# Patient Record
Sex: MALE | Race: WHITE | NOT HISPANIC OR LATINO | Employment: OTHER | URBAN - METROPOLITAN AREA
[De-identification: names, ages, dates, MRNs, and addresses within clinical notes are randomized per-mention and may not be internally consistent; named-entity substitution may affect disease eponyms.]

---

## 2017-01-19 RX ORDER — CITALOPRAM 20 MG/1
TABLET ORAL
Qty: 30 TABLET | Refills: 1 | Status: SHIPPED | OUTPATIENT
Start: 2017-01-19 | End: 2017-03-10 | Stop reason: SDUPTHER

## 2017-01-19 RX ORDER — TRAZODONE HYDROCHLORIDE 100 MG/1
TABLET ORAL
Qty: 30 TABLET | Refills: 1 | Status: SHIPPED | OUTPATIENT
Start: 2017-01-19 | End: 2017-03-10 | Stop reason: SDUPTHER

## 2017-01-31 ENCOUNTER — HOSPITAL ENCOUNTER (OUTPATIENT)
Dept: SLEEP MEDICINE | Facility: HOSPITAL | Age: 62
Discharge: HOME | End: 2017-01-31
Attending: INTERNAL MEDICINE | Admitting: INTERNAL MEDICINE

## 2017-02-22 ENCOUNTER — TELEPHONE (OUTPATIENT)
Dept: FAMILY MEDICINE CLINIC | Facility: CLINIC | Age: 62
End: 2017-02-22

## 2017-02-22 DIAGNOSIS — M50.90 CERVICAL NECK PAIN WITH EVIDENCE OF DISC DISEASE: ICD-10-CM

## 2017-02-22 RX ORDER — PENICILLIN V POTASSIUM 500 MG/1
500 TABLET ORAL 4 TIMES DAILY
Qty: 28 TABLET | Refills: 0 | Status: SHIPPED | OUTPATIENT
Start: 2017-02-22 | End: 2017-02-22 | Stop reason: SDUPTHER

## 2017-02-22 RX ORDER — PENICILLIN V POTASSIUM 500 MG/1
500 TABLET ORAL 4 TIMES DAILY
Qty: 28 TABLET | Refills: 0 | Status: SHIPPED | OUTPATIENT
Start: 2017-02-22 | End: 2017-07-07

## 2017-02-22 RX ORDER — OXYCODONE AND ACETAMINOPHEN 10; 325 MG/1; MG/1
1 TABLET ORAL 4 TIMES DAILY PRN
Qty: 120 TABLET | Refills: 0 | Status: SHIPPED | OUTPATIENT
Start: 2017-02-22 | End: 2017-03-10 | Stop reason: SDUPTHER

## 2017-02-22 NOTE — TELEPHONE ENCOUNTER
Patient would like prescription for an antibiotic due to infected tooth and also a prescription for his pain medication

## 2017-03-10 ENCOUNTER — OFFICE VISIT (OUTPATIENT)
Dept: FAMILY MEDICINE CLINIC | Facility: CLINIC | Age: 62
End: 2017-03-10

## 2017-03-10 VITALS
WEIGHT: 159.3 LBS | HEIGHT: 69 IN | TEMPERATURE: 98.2 F | BODY MASS INDEX: 23.6 KG/M2 | DIASTOLIC BLOOD PRESSURE: 80 MMHG | HEART RATE: 83 BPM | OXYGEN SATURATION: 93 % | SYSTOLIC BLOOD PRESSURE: 118 MMHG

## 2017-03-10 DIAGNOSIS — M50.90 CERVICAL NECK PAIN WITH EVIDENCE OF DISC DISEASE: ICD-10-CM

## 2017-03-10 DIAGNOSIS — F33.41 RECURRENT MAJOR DEPRESSIVE DISORDER, IN PARTIAL REMISSION (HCC): ICD-10-CM

## 2017-03-10 DIAGNOSIS — M51.36 DEGENERATION OF INTERVERTEBRAL DISC OF LUMBAR REGION: Primary | ICD-10-CM

## 2017-03-10 PROCEDURE — 99213 OFFICE O/P EST LOW 20 MIN: CPT | Performed by: FAMILY MEDICINE

## 2017-03-10 RX ORDER — CITALOPRAM 20 MG/1
20 TABLET ORAL EVERY MORNING
Qty: 90 TABLET | Refills: 3 | Status: SHIPPED | OUTPATIENT
Start: 2017-03-10 | End: 2018-06-04 | Stop reason: SDUPTHER

## 2017-03-10 RX ORDER — EPINEPHRINE 0.3 MG/.3ML
1 INJECTION INTRAMUSCULAR
COMMUNITY
Start: 2017-01-31

## 2017-03-10 RX ORDER — OXYCODONE AND ACETAMINOPHEN 10; 325 MG/1; MG/1
1 TABLET ORAL 4 TIMES DAILY PRN
Qty: 120 TABLET | Refills: 0 | Status: SHIPPED | OUTPATIENT
Start: 2017-03-10 | End: 2017-03-10 | Stop reason: SDUPTHER

## 2017-03-10 RX ORDER — THEOPHYLLINE 300 MG/1
TABLET, EXTENDED RELEASE ORAL
Refills: 11 | COMMUNITY
Start: 2017-02-28 | End: 2018-06-04

## 2017-03-10 RX ORDER — OXYCODONE AND ACETAMINOPHEN 10; 325 MG/1; MG/1
1 TABLET ORAL 4 TIMES DAILY PRN
Qty: 120 TABLET | Refills: 0 | Status: SHIPPED | OUTPATIENT
Start: 2017-03-10 | End: 2017-05-16 | Stop reason: SDUPTHER

## 2017-03-10 RX ORDER — LIDOCAINE AND PRILOCAINE 25; 25 MG/G; MG/G
1 CREAM TOPICAL
COMMUNITY
Start: 2016-12-13

## 2017-03-10 RX ORDER — TRAZODONE HYDROCHLORIDE 100 MG/1
100 TABLET ORAL NIGHTLY
Qty: 90 TABLET | Refills: 3 | Status: SHIPPED | OUTPATIENT
Start: 2017-03-10 | End: 2018-06-04 | Stop reason: SDUPTHER

## 2017-03-10 NOTE — PROGRESS NOTES
Chief Complaint   Patient presents with   • COPD   • Asthma   • Emphysema   • Cough   • Shortness of Breath       Low Back Pain: Patient complains of chronic low back pain. This is evaluated as a non injury. The patient first noted symptoms 10years ago. It was not related to no known injury. The pain is rated moderate, unremitting, and is located at the across the lower back or and neck. The pain is described as aching and occurs all day. The symptoms does not been progressive. Symptoms are exacerbated by coughing, extension, flexion, lifting, straining and twisting. Factors which relieve the pain include change in body position and narcotic pain medications. Other associated symptoms include no other symptoms. Previous history of symptoms: the problem is long-standing.   Treatment efforts have included rest, ice, OTC NSAIDS, prescription NSAIDS, acetaminophen, oral steroids, muscle relaxers, PT, chiropractic, home exercises, massage and traction, without relief.    Seeing new Pulm over in [place]    Still on immunoglobulin therapy,  No longer IV, it is now sub Q, via a pump at home      Objective   General:  Alert , no distress  HEENT:  WNL  Heart: RR , no murmur  Vascular: good pulses in legs/feet  Lungs: dec BS  Back: mild decrease in ROM.    Neuro: Gait is normal, reflexes normal.  Skin: no rash.    Assessment/Plan   Nahun was seen today for copd, asthma, emphysema, cough and shortness of breath.    Diagnoses and all orders for this visit:    Degeneration of intervertebral disc of lumbar region    Recurrent major depressive disorder, in partial remission  -     citalopram (CeleXA) 20 MG tablet; Take 1 tablet by mouth Every Morning.  -     traZODone (DESYREL) 100 MG tablet; Take 1 tablet by mouth Every Night.    Cervical neck pain with evidence of disc disease  -     Discontinue: oxyCODONE-acetaminophen (PERCOCET)  MG per tablet; Take 1 tablet by mouth 4 (Four) Times a Day As Needed for severe pain  (7-10).  -     oxyCODONE-acetaminophen (PERCOCET)  MG per tablet; Take 1 tablet by mouth 4 (Four) Times a Day As Needed for severe pain (7-10).              Medications Discontinued During This Encounter   Medication Reason   • citalopram (CeleXA) 20 MG tablet Reorder   • traZODone (DESYREL) 100 MG tablet Reorder   • oxyCODONE-acetaminophen (PERCOCET)  MG per tablet Reorder   • oxyCODONE-acetaminophen (PERCOCET)  MG per tablet Reorder        There are no Patient Instructions on file for this visit.      Patient has been prescribed controlled medications.  Treatment discussed  NURYS updated and reviewed.  Risk and Benefits discussed.  Per [place]HB1.    We reviewed home treatments for back pain  No heavy lifting  Nightly back stretches  OTC medications: Tylenol, Advil, Aleve, IcyHot Patches  Hot soaks in tub.    Dr. Wilbert Dixon MD  [place], [place]  [facility].

## 2017-04-03 ENCOUNTER — HOSPITAL ENCOUNTER (OUTPATIENT)
Dept: GENERAL RADIOLOGY | Facility: HOSPITAL | Age: 62
Discharge: HOME | End: 2017-04-03
Attending: INTERNAL MEDICINE | Admitting: INTERNAL MEDICINE

## 2017-04-25 ENCOUNTER — HOSPITAL ENCOUNTER (OUTPATIENT)
Dept: SLEEP MEDICINE | Facility: HOSPITAL | Age: 62
Discharge: HOME | End: 2017-04-25
Attending: INTERNAL MEDICINE | Admitting: INTERNAL MEDICINE

## 2017-05-16 ENCOUNTER — TELEPHONE (OUTPATIENT)
Dept: FAMILY MEDICINE CLINIC | Facility: CLINIC | Age: 62
End: 2017-05-16

## 2017-05-16 DIAGNOSIS — Z51.81 MEDICATION MONITORING ENCOUNTER: Primary | ICD-10-CM

## 2017-05-16 DIAGNOSIS — M50.90 CERVICAL NECK PAIN WITH EVIDENCE OF DISC DISEASE: ICD-10-CM

## 2017-05-16 RX ORDER — OXYCODONE AND ACETAMINOPHEN 10; 325 MG/1; MG/1
1 TABLET ORAL 4 TIMES DAILY PRN
Qty: 120 TABLET | Refills: 0 | Status: SHIPPED | OUTPATIENT
Start: 2017-05-16 | End: 2017-05-16 | Stop reason: SDUPTHER

## 2017-05-16 RX ORDER — OXYCODONE AND ACETAMINOPHEN 10; 325 MG/1; MG/1
1 TABLET ORAL 4 TIMES DAILY PRN
Qty: 120 TABLET | Refills: 0 | Status: SHIPPED | OUTPATIENT
Start: 2017-05-16 | End: 2017-06-14 | Stop reason: SDUPTHER

## 2017-05-18 ENCOUNTER — TELEPHONE (OUTPATIENT)
Dept: FAMILY MEDICINE CLINIC | Facility: CLINIC | Age: 62
End: 2017-05-18

## 2017-05-19 ENCOUNTER — HOSPITAL ENCOUNTER (OUTPATIENT)
Dept: RESPIRATORY THERAPY | Facility: HOSPITAL | Age: 62
Discharge: HOME | End: 2017-05-19
Attending: INTERNAL MEDICINE | Admitting: INTERNAL MEDICINE

## 2017-05-27 LAB
AMPHETAMINES UR QL SCN: NEGATIVE NG/ML
BARBITURATES UR QL SCN: NEGATIVE NG/ML
BENZODIAZ UR QL SCN: NEGATIVE NG/ML
BZE UR QL SCN: NEGATIVE NG/ML
CANNABINOIDS UR QL CFM: POSITIVE
CREAT UR-MCNC: 72.3 MG/DL (ref 20–300)
FENTANYL+NORFENTANYL UR QL SCN: NEGATIVE PG/ML
Lab: ABNORMAL
MEPERIDINE UR QL: NEGATIVE NG/ML
METHADONE UR QL SCN: NEGATIVE NG/ML
OPIATES UR QL SCN: NEGATIVE NG/ML
OXYCODONE+OXYMORPHONE UR QL SCN: POSITIVE
PCP UR QL: NEGATIVE NG/ML
PH UR: 6.5 [PH] (ref 4.5–8.9)
PROPOXYPH UR QL SCN: NEGATIVE NG/ML
SP GR UR: 1.01
TRAMADOL UR QL SCN: NEGATIVE NG/ML

## 2017-06-14 DIAGNOSIS — M50.90 CERVICAL NECK PAIN WITH EVIDENCE OF DISC DISEASE: ICD-10-CM

## 2017-06-14 RX ORDER — OXYCODONE AND ACETAMINOPHEN 10; 325 MG/1; MG/1
1 TABLET ORAL 4 TIMES DAILY PRN
Qty: 120 TABLET | Refills: 0 | Status: SHIPPED | OUTPATIENT
Start: 2017-06-14 | End: 2017-06-14 | Stop reason: SDUPTHER

## 2017-06-14 RX ORDER — OXYCODONE AND ACETAMINOPHEN 10; 325 MG/1; MG/1
1 TABLET ORAL 4 TIMES DAILY PRN
Qty: 120 TABLET | Refills: 0 | Status: CANCELLED | OUTPATIENT
Start: 2017-06-14

## 2017-06-14 RX ORDER — OXYCODONE AND ACETAMINOPHEN 10; 325 MG/1; MG/1
1 TABLET ORAL 4 TIMES DAILY PRN
Qty: 120 TABLET | Refills: 0 | Status: SHIPPED | OUTPATIENT
Start: 2017-06-14 | End: 2017-07-07 | Stop reason: SDUPTHER

## 2017-07-07 ENCOUNTER — OFFICE VISIT (OUTPATIENT)
Dept: FAMILY MEDICINE CLINIC | Facility: CLINIC | Age: 62
End: 2017-07-07

## 2017-07-07 VITALS
BODY MASS INDEX: 23.89 KG/M2 | WEIGHT: 161.3 LBS | HEART RATE: 85 BPM | TEMPERATURE: 98.2 F | DIASTOLIC BLOOD PRESSURE: 80 MMHG | OXYGEN SATURATION: 98 % | SYSTOLIC BLOOD PRESSURE: 110 MMHG | HEIGHT: 69 IN

## 2017-07-07 DIAGNOSIS — M50.90 CERVICAL NECK PAIN WITH EVIDENCE OF DISC DISEASE: ICD-10-CM

## 2017-07-07 DIAGNOSIS — J43.1 PANLOBULAR EMPHYSEMA (HCC): ICD-10-CM

## 2017-07-07 DIAGNOSIS — M50.90 DISC DISORDER OF CERVICAL REGION: Primary | ICD-10-CM

## 2017-07-07 PROCEDURE — 99213 OFFICE O/P EST LOW 20 MIN: CPT | Performed by: FAMILY MEDICINE

## 2017-07-07 RX ORDER — OXYCODONE AND ACETAMINOPHEN 10; 325 MG/1; MG/1
1 TABLET ORAL 4 TIMES DAILY PRN
Qty: 120 TABLET | Refills: 0 | Status: SHIPPED | OUTPATIENT
Start: 2017-07-07 | End: 2017-09-11 | Stop reason: SDUPTHER

## 2017-07-07 RX ORDER — ALBUTEROL SULFATE 90 UG/1
2 AEROSOL, METERED RESPIRATORY (INHALATION) EVERY 4 HOURS PRN
Qty: 1 INHALER | Refills: 11 | Status: SHIPPED | OUTPATIENT
Start: 2017-07-07 | End: 2018-06-04 | Stop reason: SDUPTHER

## 2017-07-07 NOTE — PROGRESS NOTES
Subjective   Nahun Smith is a 62 y.o. male who is here for   Chief Complaint   Patient presents with   • Follow-up     Med check    • COPD     congestion x 1 week    • Back Pain   • Shoulder Pain   .     History of Present Illness   COPD: Patient complains of dyspnea, cough, wheezing, fatigue and weight loss. Symptoms began 10 years ago. Symptoms chronic dyspnea, dyspnea after 1 blocks, dyspnea after 1 flights of stairs and inability to climb stairs does worsen with exertion. Sputum is yellow in copious amounts. Fever has been hot and cold spells. Patient uses 4 pillows at night. Patient can walk 20 feet before resting. Patient currently is on oxygen at 2 L/min per nasal cannula.. Respiratory history: COPD   Ran out of inhalers  Did not f/u with Pulm in [place]    Back pain the same      The following portions of the patient's history were reviewed and updated as appropriate: allergies, current medications, past family history, past medical history, past social history, past surgical history and problem list.    Review of Systems    Objective   Physical Exam   Constitutional: He appears lethargic. He has a sickly appearance.   Cardiovascular: Normal rate.    Pulmonary/Chest: He has wheezes. He has rales.   Musculoskeletal: He exhibits tenderness.   Neurological: He appears lethargic.   Nursing note and vitals reviewed.      Assessment/Plan   Nahun was seen today for follow-up, copd, back pain and shoulder pain.    Diagnoses and all orders for this visit:    Disc disorder of cervical region    Panlobular emphysema  -     Fluticasone Furoate-Vilanterol 200-25 MCG/INH aerosol powder ; Inhale 1 puff Daily. Indications: Asthma  -     tiotropium (SPIRIVA) 18 MCG per inhalation capsule; Place 1 capsule into inhaler and inhale Daily. Indications: Chronic Obstructive Lung Disease  -     albuterol (PROVENTIL HFA;VENTOLIN HFA) 108 (90 BASE) MCG/ACT inhaler; Inhale 2 puffs Every 4 (Four) Hours As Needed for Wheezing or  Shortness of Air. Indications: Chronic Obstructive Lung Disease    Cervical neck pain with evidence of disc disease  -     oxyCODONE-acetaminophen (PERCOCET)  MG per tablet; Take 1 tablet by mouth 4 (Four) Times a Day As Needed for Severe Pain (7-10).      Patient Instructions   Patient has been prescribed controlled medications.  Treatment discussed  NURYS updated and reviewed.  Risk and Benefits discussed.  Per KYHB1.      Medications Discontinued During This Encounter   Medication Reason   • penicillin v potassium (VEETID) 500 MG tablet Therapy completed   • Fluticasone Furoate-Vilanterol 200-25 MCG/INH aerosol powder  Reorder   • tiotropium (SPIRIVA) 18 MCG per inhalation capsule Reorder   • albuterol (PROVENTIL HFA;VENTOLIN HFA) 108 (90 BASE) MCG/ACT inhaler Reorder   • oxyCODONE-acetaminophen (PERCOCET)  MG per tablet Reorder        Return in about 4 months (around 11/7/2017).    Dr. Wilbert Dixon  [place], [place]

## 2017-07-07 NOTE — PATIENT INSTRUCTIONS
Patient has been prescribed controlled medications.  Treatment discussed  NUYRS updated and reviewed.  Risk and Benefits discussed.  Per KYHB1.

## 2017-07-18 ENCOUNTER — DOCUMENTATION (OUTPATIENT)
Dept: FAMILY MEDICINE CLINIC | Facility: CLINIC | Age: 62
End: 2017-07-18

## 2017-07-18 NOTE — NURSING NOTE
Pt called in today and C/O of visible blood in urine. I spoke to Dr. Dixon and due to patients limited transportation, Dr. Dixon advised pt to go to the ER as soon as wife gets home from work.

## 2017-09-11 DIAGNOSIS — M50.90 CERVICAL NECK PAIN WITH EVIDENCE OF DISC DISEASE: ICD-10-CM

## 2017-09-11 RX ORDER — OXYCODONE AND ACETAMINOPHEN 10; 325 MG/1; MG/1
1 TABLET ORAL 4 TIMES DAILY PRN
Qty: 120 TABLET | Refills: 0 | Status: SHIPPED | OUTPATIENT
Start: 2017-09-11 | End: 2017-09-11 | Stop reason: SDUPTHER

## 2017-09-11 RX ORDER — OXYCODONE AND ACETAMINOPHEN 10; 325 MG/1; MG/1
1 TABLET ORAL 4 TIMES DAILY PRN
Qty: 120 TABLET | Refills: 0 | Status: SHIPPED | OUTPATIENT
Start: 2017-09-11 | End: 2017-11-10 | Stop reason: SDUPTHER

## 2017-09-11 NOTE — TELEPHONE ENCOUNTER
Last OV: 07/07/2017  Next OV: 11/10/2017  Last RF: 07/07/2017  # 120        0rfs  [place]: done today

## 2017-10-20 ENCOUNTER — HOSPITAL ENCOUNTER (OUTPATIENT)
Dept: NUCLEAR MEDICINE | Facility: HOSPITAL | Age: 62
Discharge: HOME | End: 2017-10-20
Attending: UROLOGY | Admitting: UROLOGY

## 2017-11-07 DIAGNOSIS — M50.90 CERVICAL NECK PAIN WITH EVIDENCE OF DISC DISEASE: ICD-10-CM

## 2017-11-08 ENCOUNTER — TELEPHONE (OUTPATIENT)
Dept: FAMILY MEDICINE CLINIC | Facility: CLINIC | Age: 62
End: 2017-11-08

## 2017-11-08 RX ORDER — AMOXICILLIN 500 MG/1
500 TABLET, FILM COATED ORAL 3 TIMES DAILY
Qty: 21 TABLET | Refills: 0 | Status: SHIPPED | OUTPATIENT
Start: 2017-11-08 | End: 2017-11-15

## 2017-11-10 ENCOUNTER — OFFICE VISIT (OUTPATIENT)
Dept: FAMILY MEDICINE CLINIC | Facility: CLINIC | Age: 62
End: 2017-11-10

## 2017-11-10 VITALS
DIASTOLIC BLOOD PRESSURE: 110 MMHG | BODY MASS INDEX: 22.96 KG/M2 | OXYGEN SATURATION: 93 % | SYSTOLIC BLOOD PRESSURE: 166 MMHG | WEIGHT: 155 LBS | TEMPERATURE: 98.1 F | HEIGHT: 69 IN | HEART RATE: 111 BPM

## 2017-11-10 DIAGNOSIS — K52.9 GASTROENTERITIS: ICD-10-CM

## 2017-11-10 DIAGNOSIS — M50.90 CERVICAL NECK PAIN WITH EVIDENCE OF DISC DISEASE: ICD-10-CM

## 2017-11-10 DIAGNOSIS — M51.36 DEGENERATION OF INTERVERTEBRAL DISC OF LUMBAR REGION: ICD-10-CM

## 2017-11-10 DIAGNOSIS — J43.1 PANLOBULAR EMPHYSEMA (HCC): Primary | ICD-10-CM

## 2017-11-10 PROCEDURE — 99213 OFFICE O/P EST LOW 20 MIN: CPT | Performed by: FAMILY MEDICINE

## 2017-11-10 RX ORDER — OXYCODONE AND ACETAMINOPHEN 10; 325 MG/1; MG/1
1 TABLET ORAL 4 TIMES DAILY PRN
Qty: 120 TABLET | Refills: 0 | Status: SHIPPED | OUTPATIENT
Start: 2017-11-10 | End: 2017-11-10 | Stop reason: SDUPTHER

## 2017-11-10 RX ORDER — OXYCODONE AND ACETAMINOPHEN 10; 325 MG/1; MG/1
1 TABLET ORAL 4 TIMES DAILY PRN
Qty: 120 TABLET | Refills: 0 | Status: SHIPPED | OUTPATIENT
Start: 2017-11-10 | End: 2018-01-04 | Stop reason: SDUPTHER

## 2017-11-10 RX ORDER — ONDANSETRON HYDROCHLORIDE 8 MG/1
8 TABLET, FILM COATED ORAL EVERY 8 HOURS PRN
Qty: 30 TABLET | Refills: 1 | Status: SHIPPED | OUTPATIENT
Start: 2017-11-10

## 2017-11-10 NOTE — PROGRESS NOTES
Subjective   Nahun Smith is a 62 y.o. male who is here for   Chief Complaint   Patient presents with   • Follow-up   • COPD   • Back Pain   • Diarrhea     x 3 days    .     History of Present Illness   Diarrhea: Patient complains of diarrhea. Onset of diarrhea was several days ago. Diarrhea is occurring approximately several times per day. Patient describes diarrhea as semisolid and watery. Diarrhea has been associated with abdominal pain described as aching.  Patient denies blood in stool, fever, illness in household contacts, recent antibiotic use, recent camping, recent travel, significant abdominal pain, unintentional weight loss.  Previous visits for diarrhea: none. Evaluation to date: none. Treatment to date: x      The following portions of the patient's history were reviewed and updated as appropriate: allergies, current medications, past family history, past medical history, past social history, past surgical history and problem list.    Review of Systems    Objective   Physical Exam   Constitutional: He appears cachectic. He has a sickly appearance. He appears ill.   Cardiovascular: Tachycardia present.    Pulmonary/Chest: Tachypnea noted. He has wheezes.   Abdominal: There is generalized tenderness.   Nursing note and vitals reviewed.      Assessment/Plan   Nahun was seen today for follow-up, copd, back pain and diarrhea.    Diagnoses and all orders for this visit:    Panlobular emphysema    Degeneration of intervertebral disc of lumbar region    Gastroenteritis  -     ondansetron (ZOFRAN) 8 MG tablet; Take 1 tablet by mouth Every 8 (Eight) Hours As Needed for Nausea or Vomiting.    Cervical neck pain with evidence of disc disease  -     Discontinue: oxyCODONE-acetaminophen (PERCOCET)  MG per tablet; Take 1 tablet by mouth 4 (Four) Times a Day As Needed for Severe Pain .  -     oxyCODONE-acetaminophen (PERCOCET)  MG per tablet; Take 1 tablet by mouth 4 (Four) Times a Day As Needed for  Severe Pain .      Patient Instructions   Very poor immune system,so a stomach virus is possible  D/w wife: push fluids and crackers  If worse to [facility] this weekend  Also a concern about narcotic detox.      Medications Discontinued During This Encounter   Medication Reason   • oxyCODONE-acetaminophen (PERCOCET)  MG per tablet Reorder   • oxyCODONE-acetaminophen (PERCOCET)  MG per tablet Reorder        Return in about 4 months (around 3/10/2018).    Dr. Wilbert Dixon  [place], [place]

## 2017-11-14 NOTE — PATIENT INSTRUCTIONS
Very poor immune system,so a stomach virus is possible  D/w wife: push fluids and crackers  If worse to [facility] this weekend  Also a concern about narcotic detox.

## 2018-01-04 DIAGNOSIS — M50.90 CERVICAL NECK PAIN WITH EVIDENCE OF DISC DISEASE: ICD-10-CM

## 2018-01-04 RX ORDER — OXYCODONE AND ACETAMINOPHEN 10; 325 MG/1; MG/1
1 TABLET ORAL 4 TIMES DAILY PRN
Qty: 120 TABLET | Refills: 0 | Status: SHIPPED | OUTPATIENT
Start: 2018-01-04 | End: 2018-01-04 | Stop reason: SDUPTHER

## 2018-01-04 RX ORDER — OXYCODONE AND ACETAMINOPHEN 10; 325 MG/1; MG/1
1 TABLET ORAL 4 TIMES DAILY PRN
Qty: 120 TABLET | Refills: 0 | Status: SHIPPED | OUTPATIENT
Start: 2018-01-04 | End: 2018-03-07 | Stop reason: SDUPTHER

## 2018-03-07 ENCOUNTER — RESULTS ENCOUNTER (OUTPATIENT)
Dept: FAMILY MEDICINE CLINIC | Facility: CLINIC | Age: 63
End: 2018-03-07

## 2018-03-07 DIAGNOSIS — Z51.81 MEDICATION MONITORING ENCOUNTER: ICD-10-CM

## 2018-03-07 DIAGNOSIS — M50.90 CERVICAL NECK PAIN WITH EVIDENCE OF DISC DISEASE: ICD-10-CM

## 2018-03-07 DIAGNOSIS — Z51.81 MEDICATION MONITORING ENCOUNTER: Primary | ICD-10-CM

## 2018-03-07 RX ORDER — OXYCODONE AND ACETAMINOPHEN 10; 325 MG/1; MG/1
1 TABLET ORAL 4 TIMES DAILY PRN
Qty: 120 TABLET | Refills: 0 | Status: SHIPPED | OUTPATIENT
Start: 2018-03-07 | End: 2018-03-07 | Stop reason: SDUPTHER

## 2018-03-07 RX ORDER — OXYCODONE AND ACETAMINOPHEN 10; 325 MG/1; MG/1
1 TABLET ORAL 4 TIMES DAILY PRN
Qty: 120 TABLET | Refills: 0 | Status: SHIPPED | OUTPATIENT
Start: 2018-03-07 | End: 2018-05-08 | Stop reason: SDUPTHER

## 2018-03-07 NOTE — TELEPHONE ENCOUNTER
Last OV:  11/10/2017  Next OV:  Last RF: 01/04/2018  #   120      0rfs  [place]: 09/12/2017      Ok i printed 2.  UDS on arrival.    Wilbert Dixon MD

## 2018-03-14 LAB
AMPHETAMINES UR QL SCN: NEGATIVE NG/ML
BARBITURATES UR QL SCN: NEGATIVE NG/ML
BENZODIAZ UR QL: NEGATIVE NG/ML
BZE UR QL: NEGATIVE NG/ML
CANNABINOIDS UR QL SCN: POSITIVE
OPIATES UR QL: NEGATIVE
PCP UR QL: NEGATIVE NG/ML

## 2018-03-19 ENCOUNTER — OFFICE VISIT (OUTPATIENT)
Dept: FAMILY MEDICINE CLINIC | Facility: CLINIC | Age: 63
End: 2018-03-19

## 2018-03-19 VITALS
SYSTOLIC BLOOD PRESSURE: 120 MMHG | TEMPERATURE: 97.6 F | OXYGEN SATURATION: 96 % | BODY MASS INDEX: 22.07 KG/M2 | DIASTOLIC BLOOD PRESSURE: 84 MMHG | HEART RATE: 76 BPM | WEIGHT: 149 LBS | HEIGHT: 69 IN

## 2018-03-19 DIAGNOSIS — Z12.11 SCREEN FOR COLON CANCER: ICD-10-CM

## 2018-03-19 DIAGNOSIS — K08.9 DENTAL DISORDER: ICD-10-CM

## 2018-03-19 DIAGNOSIS — M50.90 DISC DISORDER OF CERVICAL REGION: Primary | ICD-10-CM

## 2018-03-19 DIAGNOSIS — J43.1 PANLOBULAR EMPHYSEMA (HCC): ICD-10-CM

## 2018-03-19 DIAGNOSIS — R13.12 OROPHARYNGEAL DYSPHAGIA: ICD-10-CM

## 2018-03-19 PROBLEM — R25.1 TREMOR: Status: ACTIVE | Noted: 2018-03-19

## 2018-03-19 PROBLEM — K52.9 GASTROENTERITIS: Status: RESOLVED | Noted: 2017-11-10 | Resolved: 2018-03-19

## 2018-03-19 PROCEDURE — 99214 OFFICE O/P EST MOD 30 MIN: CPT | Performed by: FAMILY MEDICINE

## 2018-03-19 NOTE — PATIENT INSTRUCTIONS
Patient has been prescribed controlled medications.  Treatment discussed  NURYS updated and reviewed.  Risk and Benefits discussed.  Per KYHB1.

## 2018-03-19 NOTE — PROGRESS NOTES
Subjective   Nahun Smith is a 62 y.o. male who is here for   Chief Complaint   Patient presents with   • Difficulty Swallowing     x 1 month    • Neck Pain   • Tremors   • Dental Problem   • GI Problem   .     History of Present Illness   Dysphagia: Patient complains of difficulty swallowing. The dysphagia occurs with both solids and liquids Symptoms have been present for approximately several days. The symptoms are gradually worsening. The dysphagia has been persistent and has been progressive.  He complains of frequent heartburn.  He denies melena, hematochezia, hematemesis, and coffee ground emesis.  This has been associated with abdominal bloating, choking on food, deep pressure at base of neck, difficulty swallowing and dysphagia.  He denies unexpected weight loss.    Cervical pain is even worse.  Radicular pain into right arm  Right hand tremor is worse.    Is overdue for a cscope, with [facility].    Needs all his teeth pulled  They are all rotten.    Still on night time oxygen for his COPD.    The following portions of the patient's history were reviewed and updated as appropriate: allergies, current medications, past medical history, past social history, past surgical history and problem list.    Review of Systems    Objective   Physical Exam   Constitutional: He appears cachectic. He has a sickly appearance.   HENT:   Mouth/Throat: Oropharynx is clear and moist. Mucous membranes are dry. Abnormal dentition. Dental caries present. Tonsils are 1+ on the right. Tonsils are 1+ on the left. No tonsillar exudate.   Neck: Spinous process tenderness and muscular tenderness present. Neck rigidity present. Decreased range of motion present.   Cardiovascular: Normal rate.    Pulmonary/Chest: He has wheezes.   Musculoskeletal: He exhibits tenderness.   Neurological: He is alert.   Nursing note and vitals reviewed.      Assessment/Plan   Nahun was seen today for difficulty swallowing, neck pain, tremors,  dental problem and gi problem.    Diagnoses and all orders for this visit:    Disc disorder of cervical region  -     MRI cervical spine wo contrast; Future    Panlobular emphysema    Oropharyngeal dysphagia  -     FL esophagram complete; Future    Screen for colon cancer  -     Amb referral for Screening Colonoscopy    Dental disorder  -     Ambulatory Referral to Dentistry      Patient Instructions   Patient has been prescribed controlled medications.  Treatment discussed  NURYS updated and reviewed.  Risk and Benefits discussed.  Per KYHB1.        There are no discontinued medications.     Return in about 1 month (around 4/19/2018).    Dr. Wilbert Dixon  [facility] [place], [place]

## 2018-03-20 DIAGNOSIS — M50.90 DISC DISORDER OF CERVICAL REGION: Primary | ICD-10-CM

## 2018-03-26 LAB
OXYCODONE UR CFM-MCNC: 3690 NG/ML
OXYCODONE UR QL CFM: POSITIVE
OXYCODONE+OXYMORPHONE UR QL SCN: NORMAL NG/ML
OXYCODONE+OXYMORPHONE UR QL SCN: POSITIVE
OXYMORPHONE UR CFM-MCNC: 1770 NG/ML
OXYMORPHONE UR QL CFM: POSITIVE
WRITTEN AUTHORIZATION: NORMAL

## 2018-04-02 ENCOUNTER — HOSPITAL ENCOUNTER (OUTPATIENT)
Dept: GENERAL RADIOLOGY | Facility: HOSPITAL | Age: 63
Discharge: HOME | End: 2018-04-02
Attending: FAMILY MEDICINE | Admitting: FAMILY MEDICINE

## 2018-04-06 ENCOUNTER — HOSPITAL ENCOUNTER (OUTPATIENT)
Dept: MRI IMAGING | Facility: HOSPITAL | Age: 63
Discharge: HOME | End: 2018-04-06
Attending: FAMILY MEDICINE | Admitting: FAMILY MEDICINE

## 2018-04-09 ENCOUNTER — PRIOR AUTHORIZATION (OUTPATIENT)
Dept: FAMILY MEDICINE CLINIC | Facility: CLINIC | Age: 63
End: 2018-04-09

## 2018-04-16 ENCOUNTER — OFFICE VISIT (OUTPATIENT)
Dept: FAMILY MEDICINE CLINIC | Facility: CLINIC | Age: 63
End: 2018-04-16

## 2018-04-16 VITALS
OXYGEN SATURATION: 96 % | HEART RATE: 69 BPM | HEIGHT: 69 IN | SYSTOLIC BLOOD PRESSURE: 128 MMHG | DIASTOLIC BLOOD PRESSURE: 80 MMHG | BODY MASS INDEX: 21.31 KG/M2 | WEIGHT: 143.9 LBS

## 2018-04-16 DIAGNOSIS — K21.00 GASTROESOPHAGEAL REFLUX DISEASE WITH ESOPHAGITIS: ICD-10-CM

## 2018-04-16 DIAGNOSIS — M50.90 DISC DISORDER OF CERVICAL REGION: Primary | ICD-10-CM

## 2018-04-16 DIAGNOSIS — R13.12 OROPHARYNGEAL DYSPHAGIA: ICD-10-CM

## 2018-04-16 DIAGNOSIS — J43.1 PANLOBULAR EMPHYSEMA (HCC): ICD-10-CM

## 2018-04-16 PROCEDURE — 99213 OFFICE O/P EST LOW 20 MIN: CPT | Performed by: FAMILY MEDICINE

## 2018-04-16 RX ORDER — PANTOPRAZOLE SODIUM 40 MG/1
40 TABLET, DELAYED RELEASE ORAL DAILY
Qty: 30 TABLET | Refills: 11 | Status: SHIPPED | OUTPATIENT
Start: 2018-04-16

## 2018-04-16 NOTE — PROGRESS NOTES
Chief Complaint   Patient presents with   • Follow-up     xray Results    • Neck Pain   • GI Problem     barium swallow       cervical Pain: Patient complains of chronic low back pain. This is evaluated as a non injury. The patient first noted symptoms 10years ago. It was not related to no known injury. The pain is rated severe, unremitting, and is located at the base of neck. The pain is described as aching and occurs all day. The symptoms has been progressive. Symptoms are exacerbated by straining and twisting. Factors which relieve the pain include narcotic pain medications. Other associated symptoms include weakness in arms hands. Previous history of symptoms: the problem is long-standing.   Treatment efforts have included percocet, without relief.  reviewed c spine xrays , C4-5 and C 5-6 DDD    Mri was done, report is pending    GERD: Barry complains of heartburn. This has been associated with belching, choking on food, cough, difficulty swallowing, dysphagia, heartburn, hoarseness, laryngitis, need to clear throat frequently, regurgitation of undigested food, shortness of breath and unexpected weight loss.  He denies hematemesis. Symptoms have been present for 10 years. He has had dysphagia for both solids and liquids.  He has had a weight loss of 10 pounds over a period of 3 months. He denies melena, hematochezia, hematemesis, and coffee ground emesis. Medical therapy in the past has included proton pump inhibitors.  Reviewed his barium swallow, moderate reflux and a hiatal hernia.    Ok by me to proceed with teeth extraction.        Objective   General:  Alert , no distress, lethargic, slurred speech. brought in by his sister, thin  HEENT:  Teeth in terrible shape  Heart: RR , no murmur  Vascular: good pulses in legs/feet  Lungs: clear  nec: mild decrease in ROM.    Neuro: Gait is normal, reflexes normal.  Skin: no rash.  Assessment/Plan   Nahun was seen today for follow-up, neck pain and gi  problem.    Diagnoses and all orders for this visit:    Disc disorder of cervical region    Gastroesophageal reflux disease with esophagitis    Oropharyngeal dysphagia    Panlobular emphysema    Other orders  -     pantoprazole (PROTONIX) 40 MG EC tablet; Take 1 tablet by mouth Daily.              Medications Discontinued During This Encounter   Medication Reason   • esomeprazole (NexIUM) 40 MG capsule Reorder        Patient Instructions   Will call [facility] for neck mri report  Then will refer to [facility].        Patient has been prescribed controlled medications.  Treatment discussed  NURYS updated and reviewed.  Risk and Benefits discussed.  Per KYHB1.    We reviewed home treatments for back pain  No heavy lifting  Nightly back stretches  OTC medications: Tylenol, Advil, Aleve, IcyHot Patches  Hot soaks in tub.    Dr. Wilbert Dixon MD  [place], [place]  [facility].

## 2018-04-16 NOTE — PATIENT INSTRUCTIONS
Will call [facility] for neck mri report  Then will refer to [facility] neuro surg group.

## 2018-04-17 DIAGNOSIS — M50.90 DISC DISORDER OF CERVICAL REGION: Primary | ICD-10-CM

## 2018-05-01 ENCOUNTER — TELEPHONE (OUTPATIENT)
Dept: FAMILY MEDICINE CLINIC | Facility: CLINIC | Age: 63
End: 2018-05-01

## 2018-05-02 DIAGNOSIS — M50.90 CERVICAL NECK PAIN WITH EVIDENCE OF DISC DISEASE: ICD-10-CM

## 2018-05-02 RX ORDER — OXYCODONE AND ACETAMINOPHEN 10; 325 MG/1; MG/1
1 TABLET ORAL 4 TIMES DAILY PRN
Qty: 120 TABLET | Refills: 0 | OUTPATIENT
Start: 2018-05-02

## 2018-05-02 NOTE — TELEPHONE ENCOUNTER
Last OV: 04/16/2018  Next OV: 07/20/2018  Last RF: 03/07/2018  # 120        0rfs  [place]: 03/08/2018

## 2018-05-08 DIAGNOSIS — M50.90 CERVICAL NECK PAIN WITH EVIDENCE OF DISC DISEASE: ICD-10-CM

## 2018-05-08 RX ORDER — OXYCODONE AND ACETAMINOPHEN 10; 325 MG/1; MG/1
1 TABLET ORAL 4 TIMES DAILY PRN
Qty: 120 TABLET | Refills: 0 | Status: SHIPPED | OUTPATIENT
Start: 2018-05-08 | End: 2018-06-04 | Stop reason: SDUPTHER

## 2018-05-08 NOTE — TELEPHONE ENCOUNTER
Will fill on due date.    Due this week  Ok  I printed 2.  Call his wife and let her know about refills and make sure she is still controlling his narcotic use, as the last 2 OV here with me he was overmedicated.      Wilbert Dixon MD

## 2018-06-04 ENCOUNTER — OFFICE VISIT (OUTPATIENT)
Dept: FAMILY MEDICINE CLINIC | Facility: CLINIC | Age: 63
End: 2018-06-04

## 2018-06-04 VITALS
DIASTOLIC BLOOD PRESSURE: 82 MMHG | TEMPERATURE: 98 F | HEART RATE: 103 BPM | BODY MASS INDEX: 19.4 KG/M2 | WEIGHT: 131 LBS | SYSTOLIC BLOOD PRESSURE: 118 MMHG | HEIGHT: 69 IN | OXYGEN SATURATION: 95 %

## 2018-06-04 DIAGNOSIS — M50.90 CERVICAL NECK PAIN WITH EVIDENCE OF DISC DISEASE: ICD-10-CM

## 2018-06-04 DIAGNOSIS — F33.41 RECURRENT MAJOR DEPRESSIVE DISORDER, IN PARTIAL REMISSION (HCC): ICD-10-CM

## 2018-06-04 DIAGNOSIS — G70.00 MYASTHENIA GRAVIS (HCC): Primary | ICD-10-CM

## 2018-06-04 PROBLEM — Z09 HOSPITAL DISCHARGE FOLLOW-UP: Status: ACTIVE | Noted: 2018-06-04

## 2018-06-04 PROCEDURE — 99213 OFFICE O/P EST LOW 20 MIN: CPT | Performed by: FAMILY MEDICINE

## 2018-06-04 RX ORDER — OXYCODONE AND ACETAMINOPHEN 10; 325 MG/1; MG/1
1 TABLET ORAL
COMMUNITY
End: 2018-06-04 | Stop reason: SDUPTHER

## 2018-06-04 RX ORDER — OXYCODONE AND ACETAMINOPHEN 10; 325 MG/1; MG/1
1 TABLET ORAL 4 TIMES DAILY PRN
Qty: 120 TABLET | Refills: 0 | Status: SHIPPED | OUTPATIENT
Start: 2018-06-04 | End: 2018-07-06 | Stop reason: SDUPTHER

## 2018-06-04 RX ORDER — PREDNISONE 20 MG/1
40 TABLET ORAL DAILY
COMMUNITY
End: 2018-08-06

## 2018-06-04 RX ORDER — CITALOPRAM 20 MG/1
20 TABLET ORAL
COMMUNITY
End: 2018-06-04 | Stop reason: SDUPTHER

## 2018-06-04 RX ORDER — TRAZODONE HYDROCHLORIDE 100 MG/1
50 TABLET ORAL NIGHTLY
Qty: 15 TABLET | Refills: 5 | Status: SHIPPED | OUTPATIENT
Start: 2018-06-04

## 2018-06-04 RX ORDER — CITALOPRAM 20 MG/1
20 TABLET ORAL EVERY MORNING
Qty: 30 TABLET | Refills: 5 | Status: SHIPPED | OUTPATIENT
Start: 2018-06-04

## 2018-06-04 RX ORDER — ALBUTEROL SULFATE 90 UG/1
2 AEROSOL, METERED RESPIRATORY (INHALATION)
COMMUNITY
End: 2018-06-04 | Stop reason: SDUPTHER

## 2018-06-04 RX ORDER — PYRIDOSTIGMINE BROMIDE 60 MG/1
60 TABLET ORAL
COMMUNITY
End: 2018-07-06

## 2018-06-04 RX ORDER — ALBUTEROL SULFATE 90 UG/1
2 AEROSOL, METERED RESPIRATORY (INHALATION) EVERY 4 HOURS PRN
COMMUNITY
End: 2018-10-01 | Stop reason: SDUPTHER

## 2018-06-04 RX ORDER — ESOMEPRAZOLE MAGNESIUM 40 MG/1
40 FOR SUSPENSION ORAL
COMMUNITY
End: 2018-08-06 | Stop reason: CLARIF

## 2018-06-04 RX ORDER — PANTOPRAZOLE SODIUM 40 MG/1
40 TABLET, DELAYED RELEASE ORAL
COMMUNITY
End: 2018-06-04 | Stop reason: SDUPTHER

## 2018-06-04 RX ORDER — FLUTICASONE PROPIONATE 50 MCG
SPRAY, SUSPENSION (ML) NASAL
COMMUNITY

## 2018-06-04 NOTE — PROGRESS NOTES
Subjective   Nahun Smith is a 62 y.o. male who is here for   Chief Complaint   Patient presents with   • Follow-up     ER   • Weight Loss   • Anorexia   .     History of Present Illness   Fatigue: Patient complains of fatigue. Symptoms began several months ago. Sentinal symptom the patient feels fatigue began with: significant change in weight, symptoms of arthritis and exercise intolerance. Symptoms of his fatigue have been change in appetite, diffuse soft tissue aches and pains, fatigue with paradoxical insomnia, feelings of depression, general malaise and lack of interest in usual activities. Patient describes the following psychologic symptoms: depression.  Patient denies fever, unusual rashes, GI blood loss and witnessed or suspected sleep apnea. Symptoms have gradually worsened. Severity has been struggles to carry out day to day responsibilities.. Previous visits for this problem: admitted to [facility].  i dont have many records today, but is on myesthenia gravis meds, DDx includes ALS adn miguel a barre'.   Is now on a feeding tube  Has a G tube in place  Something has gone terribly wrong with Bill.  Will fill out [place] papers for his wife.      The following portions of the patient's history were reviewed and updated as appropriate: allergies, current medications, past family history, past medical history, past social history, past surgical history and problem list.    Review of Systems    Objective   Physical Exam   Constitutional: He appears lethargic. He appears cachectic. He has a sickly appearance. He appears ill. He appears distressed.   Cardiovascular: Normal rate.    Pulmonary/Chest: He has wheezes.   Neurological: He appears lethargic. He displays abnormal reflex. A cranial nerve deficit is present. He exhibits abnormal muscle tone. Coordination abnormal.   Nursing note and vitals reviewed.  a dramatic weight loss    Assessment/Plan   Nahun was seen today for follow-up, weight loss and  anorexia.    Diagnoses and all orders for this visit:    Myasthenia gravis    Recurrent major depressive disorder, in partial remission  -     traZODone (DESYREL) 100 MG tablet; Take 0.5 tablets by mouth Every Night.  -     citalopram (CeleXA) 20 MG tablet; Take 1 tablet by mouth Every Morning.    Cervical neck pain with evidence of disc disease  -     oxyCODONE-acetaminophen (PERCOCET)  MG per tablet; Take 1 tablet by mouth 4 (Four) Times a Day As Needed for Severe Pain .    has neurology follow up next week  Asked wife to have norman abdi sent here  We have called for [place] records.    There are no Patient Instructions on file for this visit.    Medications Discontinued During This Encounter   Medication Reason   • pantoprazole (PROTONIX) 40 MG EC tablet Duplicate order   • budesonide (PULMICORT) 0.5 MG/2ML nebulizer solution Duplicate order   • albuterol (PROVENTIL HFA;VENTOLIN HFA) 108 (90 BASE) MCG/ACT inhaler Duplicate order   • albuterol (PROVENTIL HFA;VENTOLIN HFA) 108 (90 Base) MCG/ACT inhaler Duplicate order   • tiotropium (SPIRIVA) 18 MCG per inhalation capsule Duplicate order   • ipratropium-albuterol (DUO-NEB) 0.5-2.5 mg/mL nebulizer Duplicate order   • Ipratropium-Albuterol (ALBUTEROL-IPRATROPIUM IN) Duplicate order   • Immune Globulin, Human, (GAMMAGARD IJ) Duplicate order   • citalopram (CeleXA) 20 MG tablet Duplicate order   • oxyCODONE-acetaminophen (PERCOCET)  MG per tablet Duplicate order   • theophylline (THEODUR) 300 MG 12 hr tablet *Therapy completed   • traZODone (DESYREL) 100 MG tablet Reorder   • citalopram (CeleXA) 20 MG tablet Reorder   • oxyCODONE-acetaminophen (PERCOCET)  MG per tablet Reorder        Return in about 1 month (around 7/4/2018).    Dr. Wilbert Dixon  [place], [place]

## 2018-06-07 ENCOUNTER — TELEPHONE (OUTPATIENT)
Dept: FAMILY MEDICINE CLINIC | Facility: CLINIC | Age: 63
End: 2018-06-07

## 2018-07-06 ENCOUNTER — OFFICE VISIT (OUTPATIENT)
Dept: FAMILY MEDICINE CLINIC | Facility: CLINIC | Age: 63
End: 2018-07-06

## 2018-07-06 VITALS
SYSTOLIC BLOOD PRESSURE: 110 MMHG | HEART RATE: 119 BPM | OXYGEN SATURATION: 94 % | WEIGHT: 118 LBS | DIASTOLIC BLOOD PRESSURE: 78 MMHG | BODY MASS INDEX: 17.48 KG/M2 | HEIGHT: 69 IN

## 2018-07-06 DIAGNOSIS — M50.90 CERVICAL NECK PAIN WITH EVIDENCE OF DISC DISEASE: ICD-10-CM

## 2018-07-06 DIAGNOSIS — G12.21 ALS (AMYOTROPHIC LATERAL SCLEROSIS) (HCC): ICD-10-CM

## 2018-07-06 DIAGNOSIS — R10.11 RIGHT UPPER QUADRANT ABDOMINAL PAIN: ICD-10-CM

## 2018-07-06 DIAGNOSIS — Z09 HOSPITAL DISCHARGE FOLLOW-UP: Primary | ICD-10-CM

## 2018-07-06 PROBLEM — G70.00 MYASTHENIA GRAVIS (HCC): Status: RESOLVED | Noted: 2018-06-04 | Resolved: 2018-07-06

## 2018-07-06 PROBLEM — R25.1 TREMOR: Status: RESOLVED | Noted: 2018-03-19 | Resolved: 2018-07-06

## 2018-07-06 PROCEDURE — 99214 OFFICE O/P EST MOD 30 MIN: CPT | Performed by: FAMILY MEDICINE

## 2018-07-06 RX ORDER — OXYCODONE AND ACETAMINOPHEN 10; 325 MG/1; MG/1
1 TABLET ORAL 4 TIMES DAILY PRN
Qty: 120 TABLET | Refills: 0 | Status: SHIPPED | OUTPATIENT
Start: 2018-07-06 | End: 2018-07-06 | Stop reason: SDUPTHER

## 2018-07-06 RX ORDER — OXYCODONE AND ACETAMINOPHEN 10; 325 MG/1; MG/1
0.5 TABLET ORAL 4 TIMES DAILY PRN
Qty: 120 TABLET | Refills: 0
Start: 2018-07-06 | End: 2018-08-06 | Stop reason: SDUPTHER

## 2018-07-06 RX ORDER — FENTANYL 12 UG/H
1 PATCH TRANSDERMAL
Qty: 10 PATCH | Refills: 0 | Status: SHIPPED | OUTPATIENT
Start: 2018-07-06 | End: 2018-08-06 | Stop reason: DRUGHIGH

## 2018-07-06 NOTE — PROGRESS NOTES
Subjective   Nahun Smith is a 63 y.o. male who is here for   Chief Complaint   Patient presents with   • Abdominal Pain     x 1 week    .     History of Present Illness   Has a new devastating diagnosis of ALS  Is now with [facility] neurology dept  I reviewed notes.  Drastic weight loss  Has lost 80% of his motor speech  Is 100 % NPO due to choking  G tube working well  Has lost drastic weight.    Having vague right upper quad pain.      The following portions of the patient's history were reviewed and updated as appropriate: allergies, current medications, past family history, past medical history, past social history, past surgical history and problem list.    Review of Systems    Objective   Physical Exam   Constitutional: He appears cachectic. He has a sickly appearance. He appears ill.   Cardiovascular: Normal rate.    Pulmonary/Chest: He has wheezes.   Neurological: He is alert. He displays normal reflexes. No cranial nerve deficit or sensory deficit. He exhibits normal muscle tone. Coordination normal.       Assessment/Plan   Nahun was seen today for abdominal pain.    Diagnoses and all orders for this visit:    Hospital discharge follow-up    ALS (amyotrophic lateral sclerosis) (CMS/[facility])  -     fentaNYL (DURAGESIC) 12 MCG/HR; Place 1 patch on the skin Every 72 (Seventy-Two) Hours.    Right upper quadrant abdominal pain  -     Cancel: Amylase  -     Cancel: Lipase  -     Cancel: Hepatic Function Panel  -     US Gallbladder; Future  -     Lipase  -     Amylase; Future  -     Hepatic Function Panel; Future    Cervical neck pain with evidence of disc disease  -     Discontinue: oxyCODONE-acetaminophen (PERCOCET)  MG per tablet; Take 1 tablet by mouth 4 (Four) Times a Day As Needed for Severe Pain .  -     Discontinue: oxyCODONE-acetaminophen (PERCOCET)  MG per tablet; Take 1 tablet by mouth 4 (Four) Times a Day As Needed for Severe Pain .  -     oxyCODONE-acetaminophen (PERCOCET)  MG per  tablet; 0.5 tablets by Per G Tube route 4 (Four) Times a Day As Needed for Severe Pain .    will start Duragesic patches for his chronic pain  As his wife having to crush his percocet pills and flushing them via G tube.    There are no Patient Instructions on file for this visit.    Medications Discontinued During This Encounter   Medication Reason   • pyridostigmine (MESTINON) 60 MG tablet *Therapy completed   • oxyCODONE-acetaminophen (PERCOCET)  MG per tablet Reorder   • oxyCODONE-acetaminophen (PERCOCET)  MG per tablet Reorder   • oxyCODONE-acetaminophen (PERCOCET)  MG per tablet Reorder        No Follow-up on file.    Dr. Wilbert Dixon  [place], [place]

## 2018-07-11 ENCOUNTER — HOSPITAL ENCOUNTER (OUTPATIENT)
Dept: ULTRASOUND IMAGING | Facility: HOSPITAL | Age: 63
Discharge: HOME | End: 2018-07-11
Attending: FAMILY MEDICINE | Admitting: FAMILY MEDICINE

## 2018-07-11 LAB
ALBUMIN SERPL-MCNC: 3.8 G/DL (ref 3.5–4.8)
ALP SERPL-CCNC: 59 IU/L (ref 32–91)
ALT SERPL-CCNC: 23 IU/L (ref 17–63)
AMYLASE SERPL-CCNC: 60 U/L (ref 36–128)
AST SERPL-CCNC: 21 IU/L (ref 15–41)
BILIRUB DIRECT SERPL-MCNC: 0.1 MG/DL (ref 0.1–0.5)
BILIRUB SERPL-MCNC: 0.4 MG/DL (ref 0.3–1.2)
CONV TOTAL PROTEIN: 6.9 G/DL (ref 6.1–7.9)
LIPASE SERPL-CCNC: 30 U/L (ref 22–51)

## 2018-08-06 ENCOUNTER — OFFICE VISIT (OUTPATIENT)
Dept: FAMILY MEDICINE CLINIC | Facility: CLINIC | Age: 63
End: 2018-08-06

## 2018-08-06 VITALS
WEIGHT: 112 LBS | BODY MASS INDEX: 16.59 KG/M2 | DIASTOLIC BLOOD PRESSURE: 68 MMHG | HEART RATE: 99 BPM | HEIGHT: 69 IN | SYSTOLIC BLOOD PRESSURE: 100 MMHG | OXYGEN SATURATION: 90 %

## 2018-08-06 DIAGNOSIS — M50.90 CERVICAL NECK PAIN WITH EVIDENCE OF DISC DISEASE: ICD-10-CM

## 2018-08-06 DIAGNOSIS — G12.21 ALS (AMYOTROPHIC LATERAL SCLEROSIS) (HCC): Primary | ICD-10-CM

## 2018-08-06 PROCEDURE — 99213 OFFICE O/P EST LOW 20 MIN: CPT | Performed by: FAMILY MEDICINE

## 2018-08-06 RX ORDER — GLYCOPYRROLATE 1 MG/1
TABLET ORAL
COMMUNITY
Start: 2018-08-05

## 2018-08-06 RX ORDER — OXYCODONE AND ACETAMINOPHEN 10; 325 MG/1; MG/1
0.5 TABLET ORAL 4 TIMES DAILY PRN
Qty: 90 TABLET | Refills: 0 | Status: SHIPPED | OUTPATIENT
Start: 2018-08-06 | End: 2018-10-05 | Stop reason: SDUPTHER

## 2018-08-06 RX ORDER — CYCLOBENZAPRINE HCL 5 MG
TABLET ORAL
COMMUNITY
Start: 2018-07-21

## 2018-08-06 RX ORDER — FENTANYL 25 UG/H
1 PATCH TRANSDERMAL
Qty: 10 PATCH | Refills: 0 | Status: SHIPPED | OUTPATIENT
Start: 2018-08-06 | End: 2018-10-05 | Stop reason: DRUGHIGH

## 2018-08-06 RX ORDER — RILUZOLE 50 MG/1
TABLET, FILM COATED ORAL
Refills: 10 | COMMUNITY
Start: 2018-07-16

## 2018-08-06 RX ORDER — FENTANYL 25 UG/H
1 PATCH TRANSDERMAL
Qty: 10 PATCH | Refills: 0 | Status: SHIPPED | OUTPATIENT
Start: 2018-08-06 | End: 2018-08-06 | Stop reason: SDUPTHER

## 2018-08-06 NOTE — PROGRESS NOTES
Subjective   Nahun Smith is a 63 y.o. male who is here for   Chief Complaint   Patient presents with   • Follow-up   • Amyotrophic Lateral Sclerosis   • Fall   .     History of Present Illness   In rapid decline with his ALS  Has lost 50 lbs.  No aspiration  Has fallen  Has a new wheelchair.  Pain is about the same  He is 100% npo  Will transition his narcotic from crushed pills thru G tube to Duragesic patches.    The following portions of the patient's history were reviewed and updated as appropriate: allergies, current medications, past family history, past medical history, past social history, past surgical history and problem list.    Review of Systems    Objective   Physical Exam   Constitutional: He appears cachectic. He has a sickly appearance.   Cardiovascular: Normal rate.    Pulmonary/Chest: Effort normal.   Neurological: He displays abnormal reflex. A cranial nerve deficit is present. He exhibits abnormal muscle tone. Coordination abnormal.   Nursing note and vitals reviewed.      Assessment/Plan   Nahun was seen today for follow-up, amyotrophic lateral sclerosis and fall.    Diagnoses and all orders for this visit:    ALS (amyotrophic lateral sclerosis) (CMS/[facility])  -     Discontinue: fentaNYL (DURAGESIC) 25 MCG/HR patch; Place 1 patch on the skin as directed by provider Every 72 (Seventy-Two) Hours.  -     oxyCODONE-acetaminophen (PERCOCET)  MG per tablet; 0.5 tablets by Per G Tube route 4 (Four) Times a Day As Needed for Severe Pain .  -     fentaNYL (DURAGESIC) 25 MCG/HR patch; Place 1 patch on the skin as directed by provider Every 72 (Seventy-Two) Hours.    Cervical neck pain with evidence of disc disease  -     oxyCODONE-acetaminophen (PERCOCET)  MG per tablet; 0.5 tablets by Per G Tube route 4 (Four) Times a Day As Needed for Severe Pain .      Patient Instructions   Increase patch from 12 to 25  While reducing percocet to 3 a day from 4 a day.          Medications Discontinued  During This Encounter   Medication Reason   • fentaNYL (DURAGESIC) 12 MCG/HR Dose adjustment   • oxyCODONE-acetaminophen (PERCOCET)  MG per tablet Reorder   • fentaNYL (DURAGESIC) 25 MCG/HR patch Reorder   • predniSONE (DELTASONE) 20 MG tablet *Therapy completed   • metroNIDAZOLE (FLAGYL) *Therapy completed   • esomeprazole (NexIUM) 40 MG packet Formulary change        Return in about 2 months (around 10/6/2018).    Dr. Wilbert Dixon  [place], [place]

## 2018-10-01 RX ORDER — ALBUTEROL SULFATE 90 UG/1
2 AEROSOL, METERED RESPIRATORY (INHALATION) EVERY 4 HOURS PRN
Qty: 18 G | Refills: 2 | Status: SHIPPED | OUTPATIENT
Start: 2018-10-01 | End: 2018-10-01

## 2018-10-01 RX ORDER — ALBUTEROL SULFATE 1.25 MG/3ML
1 SOLUTION RESPIRATORY (INHALATION) EVERY 6 HOURS PRN
Qty: 120 VIAL | Refills: 5 | Status: SHIPPED | OUTPATIENT
Start: 2018-10-01

## 2018-10-05 ENCOUNTER — OFFICE VISIT (OUTPATIENT)
Dept: FAMILY MEDICINE CLINIC | Facility: CLINIC | Age: 63
End: 2018-10-05

## 2018-10-05 VITALS
HEIGHT: 69 IN | DIASTOLIC BLOOD PRESSURE: 70 MMHG | SYSTOLIC BLOOD PRESSURE: 108 MMHG | OXYGEN SATURATION: 92 % | BODY MASS INDEX: 16.59 KG/M2 | WEIGHT: 112 LBS | HEART RATE: 108 BPM

## 2018-10-05 DIAGNOSIS — G12.21 ALS (AMYOTROPHIC LATERAL SCLEROSIS) (HCC): Primary | ICD-10-CM

## 2018-10-05 DIAGNOSIS — M50.90 CERVICAL NECK PAIN WITH EVIDENCE OF DISC DISEASE: ICD-10-CM

## 2018-10-05 PROBLEM — R10.11 RIGHT UPPER QUADRANT ABDOMINAL PAIN: Status: RESOLVED | Noted: 2018-07-06 | Resolved: 2018-10-05

## 2018-10-05 PROCEDURE — 99214 OFFICE O/P EST MOD 30 MIN: CPT | Performed by: FAMILY MEDICINE

## 2018-10-05 PROCEDURE — 90674 CCIIV4 VAC NO PRSV 0.5 ML IM: CPT | Performed by: FAMILY MEDICINE

## 2018-10-05 PROCEDURE — G0008 ADMIN INFLUENZA VIRUS VAC: HCPCS | Performed by: FAMILY MEDICINE

## 2018-10-05 RX ORDER — OXYCODONE AND ACETAMINOPHEN 10; 325 MG/1; MG/1
0.5 TABLET ORAL 4 TIMES DAILY PRN
Qty: 90 TABLET | Refills: 0 | Status: SHIPPED | OUTPATIENT
Start: 2018-10-05 | End: 2018-10-05 | Stop reason: SDUPTHER

## 2018-10-05 RX ORDER — FENTANYL 50 UG/H
1 PATCH TRANSDERMAL
Qty: 10 PATCH | Refills: 0 | Status: SHIPPED | OUTPATIENT
Start: 2018-10-05 | End: 2018-10-05 | Stop reason: SDUPTHER

## 2018-10-05 RX ORDER — ATROPINE SULFATE 10 MG/ML
SOLUTION/ DROPS OPHTHALMIC
Refills: 5 | COMMUNITY
Start: 2018-09-10

## 2018-10-05 RX ORDER — OXYCODONE AND ACETAMINOPHEN 10; 325 MG/1; MG/1
0.5 TABLET ORAL 4 TIMES DAILY PRN
Qty: 90 TABLET | Refills: 0 | Status: SHIPPED | OUTPATIENT
Start: 2018-10-05

## 2018-10-05 RX ORDER — IPRATROPIUM BROMIDE AND ALBUTEROL SULFATE 2.5; .5 MG/3ML; MG/3ML
SOLUTION RESPIRATORY (INHALATION)
COMMUNITY
Start: 2018-10-03

## 2018-10-05 RX ORDER — FENTANYL 50 UG/H
1 PATCH TRANSDERMAL
Qty: 10 PATCH | Refills: 0 | Status: SHIPPED | OUTPATIENT
Start: 2018-10-05

## 2018-10-05 NOTE — PROGRESS NOTES
Subjective   Nahun Smith is a 63 y.o. male who is here for   Chief Complaint   Patient presents with   • Follow-up   • Amyotrophic Lateral Sclerosis   .     History of Present Illness   In rapid decline  Wheel chair most of time  Lost more weight  Lost his voice.  Falling  All nutrition via G tube  More overall pain  Reviewed his [facility] neuro notes.      The following portions of the patient's history were reviewed and updated as appropriate: allergies, current medications, past medical history, past social history, past surgical history and problem list.    Review of Systems    Objective   Physical Exam   Constitutional: He appears cachectic. He has a sickly appearance. He appears ill. No distress. Cervical collar in place.   Cardiovascular: Normal rate.    Pulmonary/Chest: Effort normal. No respiratory distress.   Neurological: He is alert. He displays abnormal reflex. A cranial nerve deficit and sensory deficit is present. He exhibits abnormal muscle tone. Coordination abnormal.   Nursing note and vitals reviewed.      Assessment/Plan   Nahun was seen today for follow-up and amyotrophic lateral sclerosis.    Diagnoses and all orders for this visit:    ALS (amyotrophic lateral sclerosis) (CMS/[facility])  -     Discontinue: oxyCODONE-acetaminophen (PERCOCET)  MG per tablet; 0.5 tablets by Per G Tube route 4 (Four) Times a Day As Needed for Severe Pain .  -     oxyCODONE-acetaminophen (PERCOCET)  MG per tablet; 0.5 tablets by Per G Tube route 4 (Four) Times a Day As Needed for Severe Pain .    Cervical neck pain with evidence of disc disease  -     Discontinue: oxyCODONE-acetaminophen (PERCOCET)  MG per tablet; 0.5 tablets by Per G Tube route 4 (Four) Times a Day As Needed for Severe Pain .  -     oxyCODONE-acetaminophen (PERCOCET)  MG per tablet; 0.5 tablets by Per G Tube route 4 (Four) Times a Day As Needed for Severe Pain .    Other orders  -     Discontinue: fentaNYL (DURAGESIC) 50 MCG/HR  patch; Place 1 patch on the skin as directed by provider Every 72 (Seventy-Two) Hours.  -     fentaNYL (DURAGESIC) 50 MCG/HR patch; Place 1 patch on the skin as directed by provider Every 72 (Seventy-Two) Hours.  -     Flucelvax Quad=>4Years ([id number])    will increase Duragesic patch  And wife will taper off norco,   Having to crush all his meds for G tube use.    There are no Patient Instructions on file for this visit.    Medications Discontinued During This Encounter   Medication Reason   • tiotropium (SPIRIVA) 18 MCG per inhalation capsule *Therapy completed   • budesonide (PULMICORT) 180 MCG/ACT inhaler *Therapy completed   • fentaNYL (DURAGESIC) 25 MCG/HR patch Dose adjustment   • fentaNYL (DURAGESIC) 50 MCG/HR patch Reorder   • oxyCODONE-acetaminophen (PERCOCET)  MG per tablet Reorder   • oxyCODONE-acetaminophen (PERCOCET)  MG per tablet Reorder        Return in about 3 months (around 1/5/2019).    Dr. Wilbert Dixon  [place], [place]

## 2024-07-15 NOTE — TELEPHONE ENCOUNTER
I spoke with Dr. Witt at [place] neurology department.  He thinks patient may have ALS.  He is sending to [facility] for second option.     Labs today